# Patient Record
Sex: MALE
[De-identification: names, ages, dates, MRNs, and addresses within clinical notes are randomized per-mention and may not be internally consistent; named-entity substitution may affect disease eponyms.]

---

## 2019-05-10 ENCOUNTER — HOSPITAL ENCOUNTER (OUTPATIENT)
Dept: HOSPITAL 74 - JER | Age: 79
Setting detail: OBSERVATION
LOS: 2 days | Discharge: HOME | End: 2019-05-12
Attending: FAMILY MEDICINE | Admitting: FAMILY MEDICINE
Payer: COMMERCIAL

## 2019-05-10 VITALS — BODY MASS INDEX: 25.8 KG/M2

## 2019-05-10 DIAGNOSIS — Z79.82: ICD-10-CM

## 2019-05-10 DIAGNOSIS — E78.5: ICD-10-CM

## 2019-05-10 DIAGNOSIS — J32.9: ICD-10-CM

## 2019-05-10 DIAGNOSIS — R09.02: ICD-10-CM

## 2019-05-10 DIAGNOSIS — I10: ICD-10-CM

## 2019-05-10 DIAGNOSIS — I25.10: ICD-10-CM

## 2019-05-10 DIAGNOSIS — N17.9: Primary | ICD-10-CM

## 2019-05-10 DIAGNOSIS — Z95.5: ICD-10-CM

## 2019-05-10 DIAGNOSIS — J44.1: ICD-10-CM

## 2019-05-10 LAB
ALBUMIN SERPL-MCNC: 3.7 G/DL (ref 3.4–5)
ALP SERPL-CCNC: 83 U/L (ref 45–117)
ALT SERPL-CCNC: 26 U/L (ref 13–61)
ANION GAP SERPL CALC-SCNC: 4 MMOL/L (ref 8–16)
AST SERPL-CCNC: 22 U/L (ref 15–37)
BASOPHILS # BLD: 0.7 % (ref 0–2)
BILIRUB SERPL-MCNC: 0.8 MG/DL (ref 0.2–1)
BNP SERPL-MCNC: 146.9 PG/ML (ref 5–450)
BUN SERPL-MCNC: 25 MG/DL (ref 7–18)
CALCIUM SERPL-MCNC: 8.7 MG/DL (ref 8.5–10.1)
CHLORIDE SERPL-SCNC: 103 MMOL/L (ref 98–107)
CO2 SERPL-SCNC: 31 MMOL/L (ref 21–32)
CREAT SERPL-MCNC: 1.7 MG/DL (ref 0.55–1.3)
DEPRECATED RDW RBC AUTO: 14.2 % (ref 11.9–15.9)
EOSINOPHIL # BLD: 0.6 % (ref 0–4.5)
GLUCOSE SERPL-MCNC: 79 MG/DL (ref 74–106)
HCT VFR BLD CALC: 48.6 % (ref 35.4–49)
HGB BLD-MCNC: 15.8 GM/DL (ref 11.7–16.9)
LYMPHOCYTES # BLD: 17.5 % (ref 8–40)
MCH RBC QN AUTO: 28.8 PG (ref 25.7–33.7)
MCHC RBC AUTO-ENTMCNC: 32.5 G/DL (ref 32–35.9)
MCV RBC: 88.7 FL (ref 80–96)
MONOCYTES # BLD AUTO: 17.8 % (ref 3.8–10.2)
NEUTROPHILS # BLD: 63.4 % (ref 42.8–82.8)
PH BLDV: 7.36 [PH] (ref 7.31–7.41)
PLATELET # BLD AUTO: 140 K/MM3 (ref 134–434)
PMV BLD: 9.1 FL (ref 7.5–11.1)
POTASSIUM SERPLBLD-SCNC: 4.8 MMOL/L (ref 3.5–5.1)
PROT SERPL-MCNC: 6.6 G/DL (ref 6.4–8.2)
RBC # BLD AUTO: 5.48 M/MM3 (ref 4–5.6)
SODIUM SERPL-SCNC: 138 MMOL/L (ref 136–145)
VENOUS PC02: 54 MMHG (ref 41–51)
VENOUS PO2: 17.6 MMHG (ref 30–40)
WBC # BLD AUTO: 5 K/MM3 (ref 4–10)

## 2019-05-10 PROCEDURE — G0378 HOSPITAL OBSERVATION PER HR: HCPCS

## 2019-05-10 RX ADMIN — MONTELUKAST SODIUM SCH MG: 10 TABLET, COATED ORAL at 22:13

## 2019-05-10 RX ADMIN — IPRATROPIUM BROMIDE AND ALBUTEROL SULFATE SCH AMP: .5; 3 SOLUTION RESPIRATORY (INHALATION) at 20:51

## 2019-05-10 RX ADMIN — IPRATROPIUM BROMIDE AND ALBUTEROL SULFATE SCH AMP: .5; 3 SOLUTION RESPIRATORY (INHALATION) at 19:02

## 2019-05-10 RX ADMIN — IPRATROPIUM BROMIDE AND ALBUTEROL SULFATE SCH AMP: .5; 3 SOLUTION RESPIRATORY (INHALATION) at 19:40

## 2019-05-11 LAB
ALBUMIN SERPL-MCNC: 3.1 G/DL (ref 3.4–5)
ALP SERPL-CCNC: 68 U/L (ref 45–117)
ALT SERPL-CCNC: 19 U/L (ref 13–61)
ANION GAP SERPL CALC-SCNC: 8 MMOL/L (ref 8–16)
AST SERPL-CCNC: 15 U/L (ref 15–37)
BASOPHILS # BLD: 0.4 % (ref 0–2)
BILIRUB SERPL-MCNC: 1 MG/DL (ref 0.2–1)
BUN SERPL-MCNC: 20 MG/DL (ref 7–18)
CALCIUM SERPL-MCNC: 8 MG/DL (ref 8.5–10.1)
CHLORIDE SERPL-SCNC: 105 MMOL/L (ref 98–107)
CO2 SERPL-SCNC: 26 MMOL/L (ref 21–32)
CREAT SERPL-MCNC: 1 MG/DL (ref 0.55–1.3)
DEPRECATED RDW RBC AUTO: 14.1 % (ref 11.9–15.9)
EOSINOPHIL # BLD: 0.1 % (ref 0–4.5)
GLUCOSE SERPL-MCNC: 106 MG/DL (ref 74–106)
HCT VFR BLD CALC: 42.4 % (ref 35.4–49)
HGB BLD-MCNC: 14.1 GM/DL (ref 11.7–16.9)
LYMPHOCYTES # BLD: 4.9 % (ref 8–40)
MCH RBC QN AUTO: 29.4 PG (ref 25.7–33.7)
MCHC RBC AUTO-ENTMCNC: 33.2 G/DL (ref 32–35.9)
MCV RBC: 88.4 FL (ref 80–96)
MONOCYTES # BLD AUTO: 5.1 % (ref 3.8–10.2)
NEUTROPHILS # BLD: 89.5 % (ref 42.8–82.8)
PLATELET # BLD AUTO: 108 K/MM3 (ref 134–434)
PMV BLD: 9.3 FL (ref 7.5–11.1)
POTASSIUM SERPLBLD-SCNC: 4.1 MMOL/L (ref 3.5–5.1)
PROT SERPL-MCNC: 5.6 G/DL (ref 6.4–8.2)
RBC # BLD AUTO: 4.79 M/MM3 (ref 4–5.6)
SODIUM SERPL-SCNC: 139 MMOL/L (ref 136–145)
WBC # BLD AUTO: 7.3 K/MM3 (ref 4–10)

## 2019-05-11 PROCEDURE — 3E0F7GC INTRODUCTION OF OTHER THERAPEUTIC SUBSTANCE INTO RESPIRATORY TRACT, VIA NATURAL OR ARTIFICIAL OPENING: ICD-10-PCS | Performed by: FAMILY MEDICINE

## 2019-05-11 PROCEDURE — 3E0333Z INTRODUCTION OF ANTI-INFLAMMATORY INTO PERIPHERAL VEIN, PERCUTANEOUS APPROACH: ICD-10-PCS | Performed by: FAMILY MEDICINE

## 2019-05-11 PROCEDURE — 3E03329 INTRODUCTION OF OTHER ANTI-INFECTIVE INTO PERIPHERAL VEIN, PERCUTANEOUS APPROACH: ICD-10-PCS | Performed by: FAMILY MEDICINE

## 2019-05-11 PROCEDURE — 3E0337Z INTRODUCTION OF ELECTROLYTIC AND WATER BALANCE SUBSTANCE INTO PERIPHERAL VEIN, PERCUTANEOUS APPROACH: ICD-10-PCS | Performed by: FAMILY MEDICINE

## 2019-05-11 RX ADMIN — ASPIRIN 81 MG SCH MG: 81 TABLET ORAL at 09:24

## 2019-05-11 RX ADMIN — METHYLPREDNISOLONE SODIUM SUCCINATE SCH MG: 40 INJECTION, POWDER, FOR SOLUTION INTRAMUSCULAR; INTRAVENOUS at 15:23

## 2019-05-11 RX ADMIN — METHYLPREDNISOLONE SODIUM SUCCINATE SCH MG: 40 INJECTION, POWDER, FOR SOLUTION INTRAMUSCULAR; INTRAVENOUS at 09:24

## 2019-05-11 RX ADMIN — CLOPIDOGREL BISULFATE SCH MG: 75 TABLET, FILM COATED ORAL at 09:24

## 2019-05-11 RX ADMIN — METHYLPREDNISOLONE SODIUM SUCCINATE SCH MG: 40 INJECTION, POWDER, FOR SOLUTION INTRAMUSCULAR; INTRAVENOUS at 21:03

## 2019-05-11 RX ADMIN — IPRATROPIUM BROMIDE SCH: 0.5 SOLUTION RESPIRATORY (INHALATION) at 05:21

## 2019-05-11 RX ADMIN — IPRATROPIUM BROMIDE AND ALBUTEROL SULFATE SCH AMP: .5; 3 SOLUTION RESPIRATORY (INHALATION) at 20:09

## 2019-05-11 RX ADMIN — METHYLPREDNISOLONE SODIUM SUCCINATE SCH MG: 40 INJECTION, POWDER, FOR SOLUTION INTRAMUSCULAR; INTRAVENOUS at 02:27

## 2019-05-11 RX ADMIN — AZITHROMYCIN DIHYDRATE SCH MLS/HR: 500 INJECTION, POWDER, LYOPHILIZED, FOR SOLUTION INTRAVENOUS at 09:30

## 2019-05-11 RX ADMIN — IPRATROPIUM BROMIDE SCH AMP: 0.5 SOLUTION RESPIRATORY (INHALATION) at 08:13

## 2019-05-11 RX ADMIN — BUDESONIDE AND FORMOTEROL FUMARATE DIHYDRATE SCH PUFF: 80; 4.5 AEROSOL RESPIRATORY (INHALATION) at 21:04

## 2019-05-11 RX ADMIN — IPRATROPIUM BROMIDE SCH AMP: 0.5 SOLUTION RESPIRATORY (INHALATION) at 12:28

## 2019-05-11 RX ADMIN — IPRATROPIUM BROMIDE AND ALBUTEROL SULFATE SCH AMP: .5; 3 SOLUTION RESPIRATORY (INHALATION) at 16:13

## 2019-05-11 RX ADMIN — MONTELUKAST SODIUM SCH MG: 10 TABLET, COATED ORAL at 21:03

## 2019-05-11 RX ADMIN — BUDESONIDE AND FORMOTEROL FUMARATE DIHYDRATE SCH PUFF: 80; 4.5 AEROSOL RESPIRATORY (INHALATION) at 09:30

## 2019-05-11 RX ADMIN — VERAPAMIL HYDROCHLORIDE SCH MG: 120 TABLET, FILM COATED, EXTENDED RELEASE ORAL at 09:25

## 2019-05-11 RX ADMIN — LOSARTAN POTASSIUM SCH MG: 50 TABLET, FILM COATED ORAL at 09:24

## 2019-05-12 VITALS — HEART RATE: 78 BPM | DIASTOLIC BLOOD PRESSURE: 65 MMHG | SYSTOLIC BLOOD PRESSURE: 116 MMHG | TEMPERATURE: 98.1 F

## 2019-05-12 RX ADMIN — ASPIRIN 81 MG SCH MG: 81 TABLET ORAL at 09:51

## 2019-05-12 RX ADMIN — IPRATROPIUM BROMIDE AND ALBUTEROL SULFATE SCH AMP: .5; 3 SOLUTION RESPIRATORY (INHALATION) at 07:59

## 2019-05-12 RX ADMIN — METHYLPREDNISOLONE SODIUM SUCCINATE SCH MG: 40 INJECTION, POWDER, FOR SOLUTION INTRAMUSCULAR; INTRAVENOUS at 03:58

## 2019-05-12 RX ADMIN — VERAPAMIL HYDROCHLORIDE SCH MG: 120 TABLET, FILM COATED, EXTENDED RELEASE ORAL at 09:59

## 2019-05-12 RX ADMIN — LOSARTAN POTASSIUM SCH MG: 50 TABLET, FILM COATED ORAL at 09:51

## 2019-05-12 RX ADMIN — AZITHROMYCIN DIHYDRATE SCH MLS/HR: 500 INJECTION, POWDER, LYOPHILIZED, FOR SOLUTION INTRAVENOUS at 11:00

## 2019-05-12 RX ADMIN — IPRATROPIUM BROMIDE AND ALBUTEROL SULFATE SCH: .5; 3 SOLUTION RESPIRATORY (INHALATION) at 11:21

## 2019-05-12 RX ADMIN — METHYLPREDNISOLONE SODIUM SUCCINATE SCH MG: 40 INJECTION, POWDER, FOR SOLUTION INTRAMUSCULAR; INTRAVENOUS at 08:42

## 2019-05-12 RX ADMIN — BUDESONIDE AND FORMOTEROL FUMARATE DIHYDRATE SCH PUFF: 80; 4.5 AEROSOL RESPIRATORY (INHALATION) at 10:00

## 2019-05-12 RX ADMIN — CLOPIDOGREL BISULFATE SCH MG: 75 TABLET, FILM COATED ORAL at 09:51

## 2022-05-26 PROBLEM — Z00.00 ENCOUNTER FOR PREVENTIVE HEALTH EXAMINATION: Status: ACTIVE | Noted: 2022-05-26

## 2022-05-27 ENCOUNTER — APPOINTMENT (OUTPATIENT)
Dept: UROLOGY | Facility: CLINIC | Age: 82
End: 2022-05-27
Payer: MEDICARE

## 2022-05-27 ENCOUNTER — TRANSCRIPTION ENCOUNTER (OUTPATIENT)
Age: 82
End: 2022-05-27

## 2022-05-27 DIAGNOSIS — E78.5 HYPERLIPIDEMIA, UNSPECIFIED: ICD-10-CM

## 2022-05-27 DIAGNOSIS — Z86.79 PERSONAL HISTORY OF OTHER DISEASES OF THE CIRCULATORY SYSTEM: ICD-10-CM

## 2022-05-27 DIAGNOSIS — I10 ESSENTIAL (PRIMARY) HYPERTENSION: ICD-10-CM

## 2022-05-27 DIAGNOSIS — I25.10 ATHEROSCLEROTIC HEART DISEASE OF NATIVE CORONARY ARTERY W/OUT ANGINA PECTORIS: ICD-10-CM

## 2022-05-27 DIAGNOSIS — Z87.891 PERSONAL HISTORY OF NICOTINE DEPENDENCE: ICD-10-CM

## 2022-05-27 DIAGNOSIS — J43.9 EMPHYSEMA, UNSPECIFIED: ICD-10-CM

## 2022-05-27 PROCEDURE — 99204 OFFICE O/P NEW MOD 45 MIN: CPT | Mod: 95

## 2022-05-27 RX ORDER — MONTELUKAST 10 MG/1
10 TABLET, FILM COATED ORAL
Refills: 0 | Status: ACTIVE | COMMUNITY

## 2022-05-27 RX ORDER — BUDESONIDE AND FORMOTEROL FUMARATE DIHYDRATE 160; 4.5 UG/1; UG/1
AEROSOL RESPIRATORY (INHALATION)
Refills: 0 | Status: ACTIVE | COMMUNITY

## 2022-05-27 RX ORDER — CLOPIDOGREL 75 MG/1
TABLET, FILM COATED ORAL
Refills: 0 | Status: ACTIVE | COMMUNITY

## 2022-05-27 RX ORDER — ATORVASTATIN CALCIUM 80 MG/1
TABLET, FILM COATED ORAL
Refills: 0 | Status: ACTIVE | COMMUNITY

## 2022-05-27 RX ORDER — APIXABAN 5 MG/1
TABLET, FILM COATED ORAL
Refills: 0 | Status: ACTIVE | COMMUNITY

## 2022-05-27 RX ORDER — LOSARTAN POTASSIUM 100 MG/1
TABLET, FILM COATED ORAL
Refills: 0 | Status: ACTIVE | COMMUNITY

## 2022-05-27 NOTE — ASSESSMENT
[FreeTextEntry1] : 80 yo male with elevated PSA 5.5 ng/ml and elevated 4K 75%. MRI on 11/11/21 demonstrated a PIRADS 3 lesion left apex. PSAD normal 0.09 ng/ml/cc, no prior biopsy, negative family hx.\par \par 1. Repeat MRI at [facility]\par 2. Book for biopsy\par 3. LUTS- happy not on alpha blocker\par \par He understands that many men with prostate cancer will die with the disease rather than of it and we also discussed the results large multi-center [place] and  prostate cancer screening trials. He also understands that PSA in and of itself does not diagnose prostate cancer but only assesses risk to a certain degree. The patient understands that to definitively screen for prostate cancer, a biopsy is required and this procedure has risks, including bleeding, infection, ED and urinary retention. The patient opted to move forward with the biopsy.\par \par The patient is aware to expect hematuria x 2 weeks and upto 4 weeks of hematospermia.  There is a risk of infection albeit much lower than a transrectal approach. In some cases patients can experience erectile dysfunction but this is usually self limiting.  Any fever/chills after the biopsy the patient is to contact the office and go to the ER for an immediate evaluation. He has been given paper instructions outlining these items - which includes medications to avoid prior to surgery.\par \par 1. CBC, BMP, PSA, Covid Test, UA UCx. EKG CXR\par 2. Cardiac and Medical Clearance. Will need to stop plavix x7 full days and eliquis x 2 full days. OK to be on baby aspirin. He will clear with his cardiologist\par 3. TP biopsy at [facility]\par 4. follow up 2 weeks after biopsy with his primary urologist or ourselves.\par 5. we will call with the path results once they are resulted.\par \par Follow up after MRI/before biopsy for image review and physical exam\par \par Chloe Castro NP, was in the office during the entirety of this telemedicine visit.

## 2022-05-27 NOTE — HISTORY OF PRESENT ILLNESS
[Home] : at home, [unfilled] , at the time of the visit. [Medical Office: ([facility])___] : at the medical office located in  [Verbal consent obtained from patient] : the patient, [unfilled] [FreeTextEntry1] : Dear Dr. Donaldson (PCP)\par Dear Dr. Anthony Montes (Rad Onc)\par \par \par Thank you so much for the referral to help care for your patient.\par \par \par Chief Complaint: Elevated PSA\par Date of first visit: 5/27/2022\par \par \par SARAH WHIPPLE  is a 81 year old  man with PMHx CAD s/p PCI (2 stents), atrial fibrillation, HLD, HTN, emphysema who presents for elevated PSA. He has met with Dr Montes, a radiation oncologist, because he thought he was primarily a urologist. Dr Montes ordered the prostate MRI. He states he does not have a urologist but has met with a few to be booked for a standard biopsy. \par \par His PSA is 5.5 ng/ml and 4K score of 75%. MRI on 11/11/21 demonstrated a PIRADS 3 lesion left apex pz and tz. His PSA density is normal (0.09 ng/ml/cc). He has never had a prostate biopsy and denies family hx of  malignancies.\par \par Experiencing moderate LUTS. Denies dysuria and hematuria. Happy not on alpha blocker.\par \par PSA Hx\par 5.55 10/20/21. 4K 75%\par \par MRI Hx\par MRI at [facility] on 11/11/2021.  59cc prostate with PIRADS 3 lesion measuring 8mm x 7mm x 7mm, left apex pz and tz.   No LAD No EPE, No Bony Lesions.  The clinical implications discussed with the patient.\par \par Biopsy Hx\par None prior\par \par 05/27/2022\par IPSS 10 QOL 2\par \par  Prostate cancer screening: the patient and I spoke at length about prostate cancer screening, its risks and its benefits. The patient has 2 (age, PSA) risk factors for prostate cancer.  He understands that many men with prostate cancer will die with the disease rather than of it. He also understands that PSA in and of itself does not diagnose prostate cancer but only assesses risk to a certain degree. The patient understands that to definitively screen for prostate cancer, a biopsy is required and this procedure has risks, including bleeding, infection, ED and urinary retention. The patient opted to repeat MRI and fusion biopsy. We did discuss his age and how we typically stop screening for prostate cancer at his age. He is aware that most men die with prostate cancer rather than of it. He is concerned about the aggressiveness of the disease should it be present and would like to proceed with biopsy.\par \par The patient denies fevers, chills, nausea and or vomiting and no unexplained weight loss.\par \par All pertinent laboratory, films and physician notes were reviewed.  Questionnaire results were discussed with patient.

## 2022-05-31 ENCOUNTER — APPOINTMENT (OUTPATIENT)
Dept: MRI IMAGING | Facility: CLINIC | Age: 82
End: 2022-05-31
Payer: MEDICARE

## 2022-05-31 ENCOUNTER — RESULT REVIEW (OUTPATIENT)
Age: 82
End: 2022-05-31

## 2022-05-31 ENCOUNTER — OUTPATIENT (OUTPATIENT)
Dept: OUTPATIENT SERVICES | Facility: HOSPITAL | Age: 82
LOS: 1 days | End: 2022-05-31

## 2022-05-31 PROCEDURE — G1004: CPT

## 2022-05-31 PROCEDURE — 72197 MRI PELVIS W/O & W/DYE: CPT | Mod: 26,ME

## 2022-06-01 ENCOUNTER — NON-APPOINTMENT (OUTPATIENT)
Age: 82
End: 2022-06-01

## 2022-06-01 DIAGNOSIS — R89.8 OTHER ABNORMAL FINDINGS IN SPECIMENS FROM OTHER ORGANS, SYSTEMS AND TISSUES: ICD-10-CM

## 2022-06-03 ENCOUNTER — NON-APPOINTMENT (OUTPATIENT)
Age: 82
End: 2022-06-03

## 2022-06-06 ENCOUNTER — APPOINTMENT (OUTPATIENT)
Dept: NUCLEAR MEDICINE | Facility: HOSPITAL | Age: 82
End: 2022-06-06

## 2022-06-06 ENCOUNTER — OUTPATIENT (OUTPATIENT)
Dept: OUTPATIENT SERVICES | Facility: HOSPITAL | Age: 82
LOS: 1 days | End: 2022-06-06
Payer: MEDICARE

## 2022-06-06 PROCEDURE — G1004: CPT

## 2022-06-06 PROCEDURE — 78306 BONE IMAGING WHOLE BODY: CPT | Mod: MG

## 2022-06-06 PROCEDURE — A9503: CPT

## 2022-06-06 PROCEDURE — 78306 BONE IMAGING WHOLE BODY: CPT | Mod: 26,MG

## 2022-06-07 ENCOUNTER — NON-APPOINTMENT (OUTPATIENT)
Age: 82
End: 2022-06-07

## 2022-06-08 ENCOUNTER — APPOINTMENT (OUTPATIENT)
Dept: UROLOGY | Facility: CLINIC | Age: 82
End: 2022-06-08
Payer: MEDICARE

## 2022-06-08 VITALS
WEIGHT: 195 LBS | SYSTOLIC BLOOD PRESSURE: 152 MMHG | OXYGEN SATURATION: 96 % | DIASTOLIC BLOOD PRESSURE: 86 MMHG | BODY MASS INDEX: 23.75 KG/M2 | TEMPERATURE: 98.2 F | HEART RATE: 62 BPM | HEIGHT: 76 IN

## 2022-06-08 PROCEDURE — 99214 OFFICE O/P EST MOD 30 MIN: CPT

## 2022-06-09 NOTE — ADDENDUM
[FreeTextEntry1] : I, Dr. Chew, personally performed the evaluation and management (E/M) services for this new patient.  That E/M includes conducting the initial examination, assessing all conditions, and establishing the plan of care.  Today, my ACP, Varsha Deleon, was here to observe my evaluation and management services for this patient to be followed going forward.\par

## 2022-06-09 NOTE — PHYSICAL EXAM
[General Appearance - Well Developed] : well developed [General Appearance - Well Nourished] : well nourished [Normal Appearance] : normal appearance [Well Groomed] : well groomed [General Appearance - In No Acute Distress] : no acute distress [Edema] : no peripheral edema [Respiration, Rhythm And Depth] : normal respiratory rhythm and effort [Exaggerated Use Of Accessory Muscles For Inspiration] : no accessory muscle use [Abdomen Soft] : soft [Abdomen Tenderness] : non-tender [Costovertebral Angle Tenderness] : no ~M costovertebral angle tenderness [Normal Station and Gait] : the gait and station were normal for the patient's age [] : no rash [No Focal Deficits] : no focal deficits [Oriented To Time, Place, And Person] : oriented to person, place, and time [Affect] : the affect was normal [Mood] : the mood was normal [Not Anxious] : not anxious [FreeTextEntry1] : declined MAYA/ exam

## 2022-06-09 NOTE — HISTORY OF PRESENT ILLNESS
[Home] : at home, [unfilled] , at the time of the visit. [Medical Office: ([facility] [place]___] : at the medical office located in  [Verbal consent obtained from patient] : the patient, [unfilled] [FreeTextEntry1] : Dear Dr. Donaldson (PCP)\par Dear Dr. Anthony Montes (Rad Onc)\par \par \par Thank you so much for the referral to help care for your patient.\par \par Chief Complaint: Elevated PSA\par Date of first visit: 5/27/2022\par \par SARAH WHIPPLE  is a 81 year old  man with PMHx CAD s/p PCI (2 stents), atrial fibrillation, HLD, HTN, emphysema who presents for elevated PSA. He has met with Dr Montes, a radiation oncologist, because he thought he was primarily a urologist. Dr Montes ordered the prostate MRI. He states he does not have a urologist but has met with a few to be booked for a standard biopsy. \par \par His PSA is 5.5 ng/ml and 4K score of 75%. MRI on 5/31/22 demonstrated 11/11/21 demonstrated a PIRADS 3 lesion right mid pzpl and diffusely abnormal bone marrow. Follow up bone scan negative. Prior MRI demonstrated a PIRADS 3 lesion left apex pz and tz. His PSA density is normal (0.11 ng/ml/cc). He has never had a prostate biopsy and denies family hx of  malignancies.\par \par Experiencing moderate LUTS. Denies dysuria and hematuria. Happy not on alpha blocker.\par \par 6/6/22 bone scan- no evidence of mets. irregular activity in right lower rib cage likely post traumatic\par \par PSA Hx\par 5.55 10/20/21. 4K 75%\par \par MRI Hx:\par MRI at [facility] on 6/8/2022. 48 cc prostate with PIRADS 3 lesion measuring 5 mm, right mid pzpl (series 5 image 18). No LAD No EPE, No Bony Lesions.  The images have been reviewed and clinical implications discussed with the patient. On review, the previously called left sided lesion is not visualized.\par \par MRI at [facility] on 11/11/2021.  59cc prostate with PIRADS 3 lesion measuring 8mm x 7mm x 7mm, left apex pz and tz.   No LAD No EPE, No Bony Lesions.  The clinical implications discussed with the patient. On review, this looks like a transition zone BPH nodule.\par \par Biopsy Hx\par None prior\par \par 06/08/22\par IPSS 11   QOL 1\par SUSANNA 25\par \par 05/27/2022\par IPSS 10 QOL 2\par \par  Prostate cancer screening: the patient and I spoke at length about prostate cancer screening, its risks and its benefits. The patient has 2 (age, PSA) risk factors for prostate cancer.  He understands that many men with prostate cancer will die with the disease rather than of it. He also understands that PSA in and of itself does not diagnose prostate cancer but only assesses risk to a certain degree. The patient understands that to definitively screen for prostate cancer, a biopsy is required and this procedure has risks, including bleeding, infection, ED and urinary retention. The patient opted to forego biopsy given age, low risk MRI, and PSA <10. \par \par The patient denies fevers, chills, nausea and or vomiting and no unexplained weight loss.\par \par All pertinent laboratory, films and physician notes were reviewed.  Questionnaire results were discussed with patient.\par \par I spent 31 minutes of non-face to face time reviewing the patient's records, chart, uploading processing MR images, transferring MR images from PACS to an independent workstation, reviewing images on independent workstation, speaking with their physician and planning their prostate biopsy and preparation of an upcoming visit for 06/08/2022 .  The imaging and planning was highly complex and took this entire time. \par \par \par

## 2022-06-09 NOTE — ASSESSMENT
[FreeTextEntry1] : 82 yo male with elevated PSA 5.5 ng/ml and elevated 4K 75%. MRI on 6/8/22 demonstrated a PIRADS 3 lesion right mid pzpl which on review seems like a BPH nodule. The previously called lesion on the R MRI is not visualized on this scan. His PSA density is normal (0.11 ng/ml/cc), no prior biopsy, neg FH, declined MAYA.\par \par Given his cardiac hx and his MD not clearing him to stop plavix, his low risk MRI, PSA <10 and normal PSAD it was discussed foregoing the biopsy and trending his PSA. He is okay with this decision. \par \par 1. PSA in 6 months\par 2. Consider MRI in 1-2 years pending PSA trend\par \par Follow up 6 months\par .\par Thank you very much for allowing me to assist in the care of this patient. Should you have any additional questions or concerns please do not hesitate to contact me.\par \par \par Sincerely,\par \par \par Vic Chew D.O.\par  of Urology and Radiology\par  of Urology at [facility]\par System Director for Prostate Cancer\par [address] [facility], [address]\par Phone: [phone]\par

## 2022-06-13 ENCOUNTER — TRANSCRIPTION ENCOUNTER (OUTPATIENT)
Age: 82
End: 2022-06-13

## 2022-06-16 ENCOUNTER — APPOINTMENT (OUTPATIENT)
Dept: UROLOGY | Facility: AMBULATORY SURGERY CENTER | Age: 82
End: 2022-06-16

## 2022-12-21 ENCOUNTER — APPOINTMENT (OUTPATIENT)
Dept: UROLOGY | Facility: CLINIC | Age: 82
End: 2022-12-21

## 2022-12-21 VITALS
SYSTOLIC BLOOD PRESSURE: 146 MMHG | HEART RATE: 66 BPM | TEMPERATURE: 97.9 F | DIASTOLIC BLOOD PRESSURE: 80 MMHG | OXYGEN SATURATION: 97 %

## 2022-12-21 PROCEDURE — 99213 OFFICE O/P EST LOW 20 MIN: CPT

## 2022-12-21 NOTE — ASSESSMENT
[FreeTextEntry1] : 81 yo male with elevated PSA 5.5 ng/ml and elevated 4K 75%. MRI on 6/8/22 demonstrated a PIRADS 3 lesion right mid pzpl which on review seems like a BPH nodule. The previously called lesion on the R MRI is not visualized on this scan. His PSA density is normal (0.11 ng/ml/cc), no prior biopsy, neg FH\par \par Given his cardiac hx and his MD not clearing him to stop plavix, his low risk MRI, PSA <10 and normal PSAD it was discussed foregoing the biopsy and trending his PSA. He is okay with this decision. \par \par 1. PSA in 6 months\par 2. Consider MRI in 1-2 years pending PSA trend\par 3. BPH mild luts no medications.\par \par Follow up 6 months\par \par Thank you very much for allowing me to assist in the care of this patient. Should you have any additional questions or concerns please do not hesitate to contact me.\par \par \par Sincerely,\par \par \par Vic Chew D.O.\par  of Urology and Radiology\par  of Urology at [facility]\par System Director for Prostate Cancer\par [address], [address] [facility], [address]\par Phone: [phone]\par

## 2022-12-21 NOTE — HISTORY OF PRESENT ILLNESS
[FreeTextEntry1] : Dear Dr. Donaldson (PCP)\par \par Thank you so much for the referral to help care for your patient.\par \par Chief Complaint: Elevated PSA\par Date of first visit: 5/27/2022\par \par SARAH WHIPPLE  is a 82 year old  man with PMHx CAD s/p PCI (2 stents), atrial fibrillation, HLD, HTN, emphysema who presents for elevated PSA.  The patient was offered a biopsy but given the clinical factors we could hold off on a biopsy.  The patient understands risk of ageing and developing prostate cancer but not all prostate cancer is aggressive.\par \par His PSA is 5.5 ng/ml and 4K score of 75%. MRI on 5/31/22 demonstrated 11/11/21 demonstrated a PIRADS 3 lesion right mid pzpl and diffusely abnormal bone marrow. Follow up bone scan negative. Prior MRI demonstrated a PIRADS 3 lesion left apex pz and tz. His PSA density is normal (0.11 ng/ml/cc). He has never had a prostate biopsy and denies family hx of  malignancies.\par \par Experiencing moderate LUTS. Denies dysuria and hematuria. Happy not on alpha blocker.\par \par 6/6/22 bone scan- no evidence of mets. irregular activity in right lower rib cage likely post traumatic\par \par PSA Hx\par 5.55 10/20/21. 4K 75%\par \par MRI Hx:\par MRI at [facility] on 5/31/2022. 47 ml prostate with PIRADS 3 lesion measuring 5 mm, right mid pzpl (series 5 image 18). No LAD No EPE, No Bony Lesions.  The images have been reviewed and clinical implications discussed with the patient. On review, the previously called left sided lesion is not visualized.\par \par MRI at [facility] on 11/11/2021.  59cc prostate with PIRADS 3 lesion measuring 8mm x 7mm x 7mm, left apex pz and tz.   No LAD No EPE, No Bony Lesions.  The clinical implications discussed with the patient. On review, this looks like a transition zone BPH nodule.\par \par Biopsy Hx\par None prior\par \par 12/21/2022\par IPSS 11     QOL 2\par SUSANNA 25\par \par 06/08/22\par IPSS 11   QOL 1\par SUSANNA 25\par \par 05/27/2022\par IPSS 10 QOL 2\par \par  Prostate cancer screening: the patient and I spoke at length about prostate cancer screening, its risks and its benefits. The patient has 2 (age, PSA) risk factors for prostate cancer.  He understands that many men with prostate cancer will die with the disease rather than of it. He also understands that PSA in and of itself does not diagnose prostate cancer but only assesses risk to a certain degree. The patient understands that to definitively screen for prostate cancer, a biopsy is required and this procedure has risks, including bleeding, infection, ED and urinary retention. The patient opted to forego biopsy given age, low risk MRI, and PSA <10. \par \par The patient denies fevers, chills, nausea and or vomiting and no unexplained weight loss.\par \par All pertinent laboratory, films and physician notes were reviewed.  Questionnaire results were discussed with patient.

## 2022-12-21 NOTE — PHYSICAL EXAM
[General Appearance - Well Developed] : well developed [General Appearance - Well Nourished] : well nourished [Abdomen Soft] : soft [Urethral Meatus] : meatus normal [Penis Abnormality] : normal circumcised penis [No Prostate Nodules] : no prostate nodules [Edema] : no peripheral edema [] : no respiratory distress [Oriented To Time, Place, And Person] : oriented to person, place, and time [No Focal Deficits] : no focal deficits

## 2022-12-27 ENCOUNTER — TRANSCRIPTION ENCOUNTER (OUTPATIENT)
Age: 82
End: 2022-12-27

## 2022-12-27 ENCOUNTER — NON-APPOINTMENT (OUTPATIENT)
Age: 82
End: 2022-12-27

## 2022-12-27 LAB
PSA FREE FLD-MCNC: 13 %
PSA FREE SERPL-MCNC: 0.82 NG/ML
PSA SERPL-MCNC: 6.36 NG/ML

## 2023-06-07 ENCOUNTER — APPOINTMENT (OUTPATIENT)
Dept: UROLOGY | Facility: CLINIC | Age: 83
End: 2023-06-07
Payer: MEDICARE

## 2023-06-07 VITALS
DIASTOLIC BLOOD PRESSURE: 67 MMHG | TEMPERATURE: 97.3 F | SYSTOLIC BLOOD PRESSURE: 119 MMHG | OXYGEN SATURATION: 98 % | HEART RATE: 71 BPM

## 2023-06-07 DIAGNOSIS — R39.9 UNSPECIFIED SYMPTOMS AND SIGNS INVOLVING THE GENITOURINARY SYSTEM: ICD-10-CM

## 2023-06-07 PROCEDURE — 99213 OFFICE O/P EST LOW 20 MIN: CPT

## 2023-06-07 NOTE — PHYSICAL EXAM
[General Appearance - Well Developed] : well developed [General Appearance - Well Nourished] : well nourished [Normal Appearance] : normal appearance [Well Groomed] : well groomed [General Appearance - In No Acute Distress] : no acute distress [Abdomen Soft] : soft [Abdomen Tenderness] : non-tender [Costovertebral Angle Tenderness] : no ~M costovertebral angle tenderness [Edema] : no peripheral edema [] : no respiratory distress [Respiration, Rhythm And Depth] : normal respiratory rhythm and effort [Exaggerated Use Of Accessory Muscles For Inspiration] : no accessory muscle use [Oriented To Time, Place, And Person] : oriented to person, place, and time [Affect] : the affect was normal [Mood] : the mood was normal [Not Anxious] : not anxious [Normal Station and Gait] : the gait and station were normal for the patient's age [No Focal Deficits] : no focal deficits [FreeTextEntry1] : neg MAYA 12/21/22

## 2023-06-07 NOTE — ADDENDUM
[FreeTextEntry1] : I, Dr. Chew, personally performed the evaluation and management (E/M) services for this established patient who presents today with (a) new problem(s)/exacerbation of (an) existing condition(s).  That E/M includes conducting the examination, assessing all new/exacerbated conditions, and establishing a new plan of care.  Today, my ACP, Varsha Deleon, was here to observe my evaluation and management services for this new problem/exacerbated condition to be followed going forward.\par

## 2023-06-07 NOTE — ASSESSMENT
[FreeTextEntry1] : 83 yo male with elevated PSA 6.36 ng/ml and elevated 4K 75%. MRI on 6/8/22 demonstrated a PIRADS 3 lesion right mid pzpl which on review seems like a BPH nodule. The previously called lesion on the R MRI is not visualized on this scan. His PSA density is normal (0.13 ng/ml/cc), no prior biopsy, neg FH\par \par Given his cardiac hx and his MD not clearing him to stop plavix, his low risk MRI, PSA <10 and normal PSAD it was discussed foregoing the biopsy and trending his PSA. He is okay with this decision. \par \par Elevated PSA\par - Trend PSA q6 months. Check today 6/7/23\par - Consider MRI in 1-3 years pending PSA trend (due May 8870-3238)\par \par BPH w/ mild LUTS\par - Happy on no medications.\par \par Follow up 6 months\par \par \par Thank you very much for allowing me to assist in the care of this patient. Should you have any additional questions or concerns please do not hesitate to contact me.\par \par \par Sincerely,\par \par \par Vic Chew D.O.\par Professor of Urology and Radiology\par  of Urology at [facility]\par System Director for Prostate Cancer\par [address] [facility]\par Phone: [phone] \par

## 2023-06-08 ENCOUNTER — TRANSCRIPTION ENCOUNTER (OUTPATIENT)
Age: 83
End: 2023-06-08

## 2023-06-08 LAB
PSA FREE FLD-MCNC: 13 %
PSA FREE SERPL-MCNC: 0.73 NG/ML
PSA SERPL-MCNC: 5.67 NG/ML

## 2024-02-05 ENCOUNTER — APPOINTMENT (OUTPATIENT)
Dept: UROLOGY | Facility: CLINIC | Age: 84
End: 2024-02-05
Payer: MEDICARE

## 2024-02-05 VITALS
SYSTOLIC BLOOD PRESSURE: 110 MMHG | HEART RATE: 77 BPM | DIASTOLIC BLOOD PRESSURE: 66 MMHG | TEMPERATURE: 97.3 F | OXYGEN SATURATION: 98 %

## 2024-02-05 DIAGNOSIS — R97.20 ELEVATED PROSTATE, SPECIFIC ANTIGEN [PSA]: ICD-10-CM

## 2024-02-05 PROCEDURE — 99213 OFFICE O/P EST LOW 20 MIN: CPT

## 2024-02-05 NOTE — HISTORY OF PRESENT ILLNESS
[FreeTextEntry1] : Dear Dr. Donaldson (PCP)  Thank you so much for the referral to help care for your patient. Chief Complaint: Elevated PSA Date of first visit: 5/27/2022  SARAH WHIPPLE is a 83  year old  man with PMHx CAD s/p PCI (4x stents), atrial fibrillation, HLD, HTN, emphysema who presents for elevated PSA. The patient was offered a biopsy but given the clinical factors we could hold off on a biopsy. The patient understands risk of ageing and developing prostate cancer but not all prostate cancer is aggressive.  His PSA is 6.36 ng/ml and 4K score of 75%. MRI on 5/31/22 demonstrated 11/11/21 demonstrated a PIRADS 3 lesion right mid pzpl and diffusely abnormal bone marrow. Follow up bone scan negative. Prior MRI demonstrated a PIRADS 3 lesion left apex pz and tz. His PSA density is normal (0.13 ng/ml/cc). He has never had a prostate biopsy and denies family hx of  malignancies.  Denies dysuria and hematuria. Happy with current urinary symptoms, not on alpha blocker, PVR 67cc today (2/5/24).   The patient presents today for 6-8 month follow-up. He states that his PCP recently checked his PSA and was noted to be "12." He does not have the records with him. Of note the patient recently had chest pressure and subsequently had two cardiac stents placed (January 2024).   6/6/22 bone scan- no evidence of mets. irregular activity in right lower rib cage likely post traumatic  PSA Hx 5.67 06/07/2023 6.36 12/21/2022 5.55 10/20/21. 4K 75%  MRI Hx: MRI at [facility] on 5/31/2022. 47 ml prostate with PIRADS 3 lesion measuring 5 mm, right mid pzpl (series 5 image 18). No LAD No EPE, No Bony Lesions. The images have been reviewed and clinical implications discussed with the patient. On review, the previously called left sided lesion is not visualized.  MRI at [facility] on 11/11/2021. 59cc prostate with PIRADS 3 lesion measuring 8mm x 7mm x 7mm, left apex pz and tz. No LAD No EPE, No Bony Lesions. The clinical implications discussed with the patient. On review, this looks like a transition zone BPH nodule.   Biopsy Hx - None  02/05/2024 6/7/2023 IPSS 9 QOL 2 SUSANNA 10  12/21/2022 IPSS 11 QOL 2 SUSANNA 25  06/08/22 IPSS 11 QOL 1 SUSANNA 25   05/27/2022 IPSS 10 QOL 2  Prostate cancer screening: the patient and I spoke at length about prostate cancer screening, its risks and its benefits. The patient has 2 (age, PSA) risk factors for prostate cancer. He understands that many men with prostate cancer will die with the disease rather than of it. He also understands that PSA in and of itself does not diagnose prostate cancer but only assesses risk to a certain degree. The patient understands that to definitively screen for prostate cancer, a biopsy is required and this procedure has risks, including bleeding, infection, ED and urinary retention. The patient opted to forego biopsy given age, low risk MRI, and PSA <10. Continue trending q6mo and reimage at 1-3 years May [id number].  The patient denies fevers, chills, nausea and or vomiting and no unexplained weight loss.  All pertinent laboratory, films and physician notes were reviewed. Questionnaire results were discussed with patient.

## 2024-02-05 NOTE — ASSESSMENT
[FreeTextEntry1] : 84 yo male with elevated PSA 6.36 ng/ml and elevated 4K 75%. MRI on 6/8/22 demonstrated a PIRADS 3 lesion right mid pzpl which on review seems like a BPH nodule. The previously called lesion on the R MRI is not visualized on this scan. His PSA density is normal (0.13 ng/ml/cc), no prior biopsy, neg FH.    Elevated PSA - Patient reported his PCP checked his PSA and jumped up to 12 (last checked June 2023 was 5.7).  - Will repeat PSA today to ensure real value - If remains elevated, will repeat MRI (compare to 5/2022).  - Given recent hx of CAD w recent PCI (x2 - January 2024), and on DAPT, discussed r/b/a of prostate cancer screening and will have discussion whether biopsy is warranted.   BPH w/ mild LUTS - Happy on no medications.  Follow up 6 months  Thank you very much for allowing me to assist in the care of this patient. Please do not hesitate to contact me with any additional questions or concerns.      Sincerely,     Vic Chew D.O. Professor of Urology and Radiology  of Urology at [facility] Director for Prostate Cancer [address] [facility], [facility] Phone: [phone]

## 2024-02-08 ENCOUNTER — TRANSCRIPTION ENCOUNTER (OUTPATIENT)
Age: 84
End: 2024-02-08

## 2024-02-08 LAB — PSA SERPL-MCNC: 6.94 NG/ML

## 2024-08-01 NOTE — ASSESSMENT
[FreeTextEntry1] : 85 yo male with elevated PSA 6.36 ng/ml and elevated 4K 75%. MRI on 6/8/22 demonstrated a PIRADS 3 lesion right mid pzpl which on review seems like a BPH nodule. The previously called lesion on the [facility] MRI is not visualized on this scan. His PSA density is normal (0.13 ng/ml/cc), no prior biopsy, neg FH.  Elevated PSA - Patient reported his PCP checked his PSA and jumped up to 12 (last checked June 2023 was 5.7). - Will repeat PSA today to ensure real value - If remains elevated, will repeat MRI (compare to 5/2022). - Given recent hx of CAD w recent PCI (x2 - January 2024), and on DAPT, discussed r/b/a of prostate cancer screening and will have discussion whether biopsy is warranted.  BPH w/ mild LUTS - Happy on no medications.  Follow up 6 months  Thank you very much for allowing me to assist in the care of this patient. Please do not hesitate to contact me with any additional questions or concerns.

## 2024-08-01 NOTE — HISTORY OF PRESENT ILLNESS
[FreeTextEntry1] : Dear Dr. Donaldson (PCP)  Thank you so much for the referral to help care for your patient. Chief Complaint: Elevated PSA Date of first visit: 5/27/2022  SARAH WHIPPLE is a 84 year old  man with PMHx CAD s/p PCI (4x stents), atrial fibrillation, HLD, HTN, emphysema who presents for elevated PSA. The patient was offered a biopsy but given the clinical factors we could hold off on a biopsy. The patient understands risk of ageing and developing prostate cancer but not all prostate cancer is aggressive.  His PSA is 6.36 ng/ml and 4K score of 75%. MRI on 5/31/22 demonstrated 11/11/21 demonstrated a PIRADS 3 lesion right mid pzpl and diffusely abnormal bone marrow. Follow up bone scan negative. Prior MRI demonstrated a PIRADS 3 lesion left apex pz and tz. His PSA density is normal (0.13 ng/ml/cc). He has never had a prostate biopsy and denies family hx of  malignancies.  Denies dysuria and hematuria. Happy with current urinary symptoms, not on alpha blocker, PVR 67cc today (2/5/24).  The patient presents today for 6-8 month follow-up. He states that his PCP recently checked his PSA and was noted to be "12." He does not have the records with him. Of note the patient recently had chest pressure and subsequently had two cardiac stents placed (January 2024).  6/6/22 bone scan- no evidence of mets. irregular activity in right lower rib cage likely post traumatic  PSA Hx 6.94 02/05/2024 5.67 06/07/2023 6.36 12/21/2022 5.55 10/20/21. 4K 75%  MRI Hx: MRI at [facility] on 5/31/2022. 47 ml prostate with PIRADS 3 lesion measuring 5 mm, right mid pzpl (series 5 image 18). No LAD No EPE, No Bony Lesions. The images have been reviewed and clinical implications discussed with the patient. On review, the previously called left sided lesion is not visualized.  MRI at [facility] on 11/11/2021. 59cc prostate with PIRADS 3 lesion measuring 8mm x 7mm x 7mm, left apex pz and tz. No LAD No EPE, No Bony Lesions. The clinical implications discussed with the patient. On review, this looks like a transition zone BPH nodule.   Biopsy Hx - None  08/12/2024 IPSS      QOL SUSANNA  02/05/2024 6/7/2023 IPSS 9 QOL 2 SUSANNA 10  12/21/2022 IPSS 11 QOL 2 SUSANNA 25  06/08/22 IPSS 11 QOL 1 SUSANNA 25   05/27/2022 IPSS 10 QOL 2  Prostate cancer screening: the patient and I spoke at length about prostate cancer screening, its risks and its benefits. The patient has 2 (age, PSA) risk factors for prostate cancer. He understands that many men with prostate cancer will die with the disease rather than of it. He also understands that PSA in and of itself does not diagnose prostate cancer but only assesses risk to a certain degree. The patient understands that to definitively screen for prostate cancer, a biopsy is required and this procedure has risks, including bleeding, infection, ED and urinary retention. The patient opted to forego biopsy given age, low risk MRI, and PSA <10. Continue trending q6mo and reimage at 1-3 years May 7048-5057.  The patient denies fevers, chills, nausea and or vomiting and no unexplained weight loss.  All pertinent laboratory, films and physician notes were reviewed. Questionnaire results were discussed with patient.

## 2024-08-12 ENCOUNTER — NON-APPOINTMENT (OUTPATIENT)
Age: 84
End: 2024-08-12

## 2024-08-12 ENCOUNTER — APPOINTMENT (OUTPATIENT)
Dept: UROLOGY | Facility: CLINIC | Age: 84
End: 2024-08-12
Payer: MEDICARE

## 2024-08-12 VITALS
DIASTOLIC BLOOD PRESSURE: 71 MMHG | SYSTOLIC BLOOD PRESSURE: 106 MMHG | OXYGEN SATURATION: 99 % | HEART RATE: 80 BPM | TEMPERATURE: 98.3 F

## 2024-08-12 DIAGNOSIS — R97.20 ELEVATED PROSTATE, SPECIFIC ANTIGEN [PSA]: ICD-10-CM

## 2024-08-12 PROCEDURE — 99213 OFFICE O/P EST LOW 20 MIN: CPT

## 2024-08-12 NOTE — ADDENDUM
[FreeTextEntry1] :   I, Dr. Chew, personally performed the evaluation and management (E/M) services for this established patient who presents today with (a) new problem(s)/exacerbation of (an) existing condition(s).  That E/M includes conducting the examination, assessing all new/exacerbated conditions, and establishing a new plan of care.  Today, my ACP, Lisa Cannon, ANP-BC, was here to observe my evaluation and management services for this new problem/exacerbated condition to be followed going forward.

## 2024-08-12 NOTE — ASSESSMENT
[FreeTextEntry1] : 84-yo male with elevated PSA 6.36 ng/ml and elevated 4K 75%. MRI on 6/8/22 demonstrated a PIRADS 3 lesion right mid pzpl which on review seems like a BPH nodule. The previously called lesion on the R MRI is not visualized on this scan. His PSA density is normal (0.13 ng/ml/cc), no prior biopsy, neg FH.  Elevated PSA - Patient reported his PCP checked his PSA and jumped up to 12 (last checked June 2023 was 5.7). -  PSA 6.94 2/5/24, repeat PSA today 8/12/24 - Given recent hx of CAD w recent PCI (x2 - January 2024), and on DAPT, discussed r/b/a of prostate cancer screening and will have a discussion whether biopsy is warranted, we will continue to monitor PSA for now  BPH w/ mild LUTS - Happy on no medications.  Follow up 6 months  Thank you very much for allowing me to assist in the care of this patient. Please do not hesitate to contact me with any additional questions or concerns.      Sincerely,     Vic Chew D.O. Professor of Urology and Radiology  of Urology at [facility] Director for Prostate Cancer [address] [facility], [facility] Phone: [phone]

## 2024-08-12 NOTE — PHYSICAL EXAM
[General Appearance - In No Acute Distress] : no acute distress [] : no respiratory distress [Prostate Tenderness] : the prostate was not tender [No Prostate Nodules] : no prostate nodules [Normal Station and Gait] : the gait and station were normal for the patient's age [Oriented To Time, Place, And Person] : oriented to person, place, and time [de-identified] : hemorrhoids

## 2024-08-12 NOTE — ASSESSMENT
[FreeTextEntry1] : 84-yo male with elevated PSA 6.36 ng/ml and elevated 4K 75%. MRI on 6/8/22 demonstrated a PIRADS 3 lesion right mid pzpl which on review seems like a BPH nodule. The previously called lesion on the R MRI is not visualized on this scan. His PSA density is normal (0.13 ng/ml/cc), no prior biopsy, neg FH.  Elevated PSA - Patient reported his PCP checked his PSA and jumped up to 12 (last checked June 2023 was 5.7). -  PSA 6.94 2/5/24, repeat PSA today 8/12/24 - Given recent hx of CAD w recent PCI (x2 - January 2024), and on DAPT, discussed r/b/a of prostate cancer screening and will have a discussion whether biopsy is warranted, we will continue to monitor PSA for now  BPH w/ mild LUTS - Happy on no medications.  Follow up 6 months  Thank you very much for allowing me to assist in the care of this patient. Please do not hesitate to contact me with any additional questions or concerns.      Sincerely,     Vic Chew D.O. Professor of Urology and Radiology  of Urology at [facility] Director for Prostate Cancer [address] [facility], [facility] Phone: [phone]

## 2024-08-12 NOTE — HISTORY OF PRESENT ILLNESS
[FreeTextEntry1] : Dear Dr. Donaldson (PCP)  Thank you so much for the referral to help care for your patient. Chief Complaint: Elevated PSA Date of first visit: 5/27/2022  SARAH WHIPPLE is a 84 year old  man with PMHx CAD s/p PCI (4x stents), atrial fibrillation, HLD, HTN, emphysema who presents for elevated PSA. The patient was offered a biopsy but given the clinical factors we could hold off on a biopsy. The patient understands risk of ageing and developing prostate cancer but not all prostate cancer is aggressive.  His PSA is 6.36 ng/ml and 4K score of 75%. MRI on 5/31/22 demonstrated 11/11/21 demonstrated a PIRADS 3 lesion right mid pzpl and diffusely abnormal bone marrow. Follow up bone scan negative. Prior MRI demonstrated a PIRADS 3 lesion left apex pz and tz. His PSA density is normal (0.13 ng/ml/cc). He has never had a prostate biopsy and denies family hx of  malignancies.  Denies dysuria and hematuria. Happy with current urinary symptoms, not on alpha blocker, PVR 67cc today (2/5/24).  The patient presents today for 6-8 month follow-up. He states that his PCP recently checked his PSA and was noted to be "12." He does not have the records with him. Of note the patient recently had chest pressure and subsequently had two cardiac stents placed (January 2024).  6/6/22 bone scan- no evidence of mets. irregular activity in right lower rib cage likely post traumatic  PSA Hx 6.94 02/05/2024 PSAD 0.14 ng/ml/cc 5.67 06/07/2023 6.36 12/21/2022 5.55 10/20/21. 4K 75%  MRI Hx: MRI at [facility] on 5/31/2022. 47 ml prostate with PIRADS 3 lesion measuring 5 mm, right mid pzpl (series 5 image 18). No LAD No EPE, No Bony Lesions. The images have been reviewed and clinical implications discussed with the patient. On review, the previously called left sided lesion is not visualized.  MRI at [facility] on 11/11/2021. 59cc prostate with PIRADS 3 lesion measuring 8mm x 7mm x 7mm, left apex pz and tz. No LAD No EPE, No Bony Lesions. The clinical implications discussed with the patient. On review, this looks like a transition zone BPH nodule.   Biopsy Hx - None  08/12/2024 IPSS      QOL SUSANNA  02/05/2024 6/7/2023 IPSS 9 QOL 2 SUSANNA 10  12/21/2022 IPSS 11 QOL 2 SUSANNA 25  06/08/22 IPSS 11 QOL 1 SUSANNA 25   05/27/2022 IPSS 10 QOL 2  Prostate cancer screening: the patient and I spoke at length about prostate cancer screening, its risks and its benefits. The patient has 2 (age, PSA) risk factors for prostate cancer. He understands that many men with prostate cancer will die with the disease rather than of it. He also understands that PSA in and of itself does not diagnose prostate cancer but only assesses risk to a certain degree. The patient understands that to definitively screen for prostate cancer, a biopsy is required and this procedure has risks, including bleeding, infection, ED and urinary retention. The patient opted to forego biopsy given age, low risk MRI, and PSA <10. Continue trending q6mo and reimage at 1-3 years May 2687-3497.  The patient denies fevers, chills, nausea and or vomiting and no unexplained weight loss.  All pertinent laboratory, films and physician notes were reviewed. Questionnaire results were discussed with patient.

## 2024-08-12 NOTE — HISTORY OF PRESENT ILLNESS
[FreeTextEntry1] : Dear Dr. Donaldson (PCP)  Thank you so much for the referral to help care for your patient. Chief Complaint: Elevated PSA Date of first visit: 5/27/2022  SARAH WHIPPLE is a 84 year old  man with PMHx CAD s/p PCI (4x stents), atrial fibrillation, HLD, HTN, emphysema who presents for elevated PSA. The patient was offered a biopsy but given the clinical factors we could hold off on a biopsy. The patient understands risk of ageing and developing prostate cancer but not all prostate cancer is aggressive.  His PSA is 6.36 ng/ml and 4K score of 75%. MRI on 5/31/22 demonstrated 11/11/21 demonstrated a PIRADS 3 lesion right mid pzpl and diffusely abnormal bone marrow. Follow up bone scan negative. Prior MRI demonstrated a PIRADS 3 lesion left apex pz and tz. His PSA density is normal (0.13 ng/ml/cc). He has never had a prostate biopsy and denies family hx of  malignancies.  Denies dysuria and hematuria. Happy with current urinary symptoms, not on alpha blocker, PVR 67cc today (2/5/24).  The patient presents today for 6-8 month follow-up. He states that his PCP recently checked his PSA and was noted to be "12." He does not have the records with him. Of note the patient recently had chest pressure and subsequently had two cardiac stents placed (January 2024).  6/6/22 bone scan- no evidence of mets. irregular activity in right lower rib cage likely post traumatic  PSA Hx 6.94 02/05/2024 PSAD 0.14 ng/ml/cc 5.67 06/07/2023 6.36 12/21/2022 5.55 10/20/21. 4K 75%  MRI Hx: MRI at [facility] on 5/31/2022. 47 ml prostate with PIRADS 3 lesion measuring 5 mm, right mid pzpl (series 5 image 18). No LAD No EPE, No Bony Lesions. The images have been reviewed and clinical implications discussed with the patient. On review, the previously called left sided lesion is not visualized.  MRI at [facility] on 11/11/2021. 59cc prostate with PIRADS 3 lesion measuring 8mm x 7mm x 7mm, left apex pz and tz. No LAD No EPE, No Bony Lesions. The clinical implications discussed with the patient. On review, this looks like a transition zone BPH nodule.   Biopsy Hx - None  08/12/2024 IPSS      QOL SUSANNA  02/05/2024 6/7/2023 IPSS 9 QOL 2 SUSANNA 10  12/21/2022 IPSS 11 QOL 2 SUSANNA 25  06/08/22 IPSS 11 QOL 1 SUSANNA 25   05/27/2022 IPSS 10 QOL 2  Prostate cancer screening: the patient and I spoke at length about prostate cancer screening, its risks and its benefits. The patient has 2 (age, PSA) risk factors for prostate cancer. He understands that many men with prostate cancer will die with the disease rather than of it. He also understands that PSA in and of itself does not diagnose prostate cancer but only assesses risk to a certain degree. The patient understands that to definitively screen for prostate cancer, a biopsy is required and this procedure has risks, including bleeding, infection, ED and urinary retention. The patient opted to forego biopsy given age, low risk MRI, and PSA <10. Continue trending q6mo and reimage at 1-3 years May 9620-3181.  The patient denies fevers, chills, nausea and or vomiting and no unexplained weight loss.  All pertinent laboratory, films and physician notes were reviewed. Questionnaire results were discussed with patient.

## 2024-08-12 NOTE — PHYSICAL EXAM
[General Appearance - In No Acute Distress] : no acute distress [] : no respiratory distress [Prostate Tenderness] : the prostate was not tender [No Prostate Nodules] : no prostate nodules [Normal Station and Gait] : the gait and station were normal for the patient's age [Oriented To Time, Place, And Person] : oriented to person, place, and time [de-identified] : hemorrhoids

## 2024-08-14 ENCOUNTER — NON-APPOINTMENT (OUTPATIENT)
Age: 84
End: 2024-08-14

## 2025-02-10 NOTE — HISTORY OF PRESENT ILLNESS
[FreeTextEntry1] : Dear Dr. Ahmadi (PCP)  Thank you so much for the referral to help care for your patient. Chief Complaint: Elevated PSA Date of first visit: 5/27/2022  MICHAEL GAONA is a 84 year old  man with PMHx CAD s/p PCI (4x stents), atrial fibrillation, HLD, HTN, emphysema who presents for elevated PSA. The patient was offered a biopsy but given the clinical factors we could hold off on a biopsy. The patient understands risk of ageing and developing prostate cancer but not all prostate cancer is aggressive.  His PSA is 6.36 ng/ml and 4K score of 75%. MRI on 5/31/22 demonstrated 11/11/21 demonstrated a PIRADS 3 lesion right mid pzpl and diffusely abnormal bone marrow. Follow up bone scan negative. Prior MRI demonstrated a PIRADS 3 lesion left apex pz and tz. His PSA density is normal (0.13 ng/ml/cc). He has never had a prostate biopsy and denies family hx of  malignancies.  Denies dysuria and hematuria. Happy with current urinary symptoms, not on alpha blocker, PVR 67cc today (2/5/24).  The patient presents today for 6-8 month follow-up. He states that his PCP recently checked his PSA and was noted to be "12." He does not have the records with him. Of note the patient recently had chest pressure and subsequently had two cardiac stents placed (January 2024).  6/6/22 bone scan- no evidence of mets. irregular activity in right lower rib cage likely post traumatic  PSA Hx 7.33 08/12/2024 6.94 02/05/2024 PSAD 0.14 ng/ml/cc 5.67 06/07/2023 6.36 12/21/2022 5.55 10/20/21. 4K 75%  MRI Hx: MRI at [facility] on 5/31/2022. 47 ml prostate with PIRADS 3 lesion measuring 5 mm, right mid pzpl (series 5 image 18). No LAD No EPE, No Bony Lesions. The images have been reviewed and clinical implications discussed with the patient. On review, the previously called left sided lesion is not visualized.  MRI at [facility] on 11/11/2021. 59cc prostate with PIRADS 3 lesion measuring 8mm x 7mm x 7mm, left apex pz and tz. No LAD No EPE, No Bony Lesions. The clinical implications discussed with the patient. On review, this looks like a transition zone BPH nodule.  Biopsy Hx - None  02/19/2025 IPSS  QOL  BEST   08/12/2024 IPSS QOL BEST  02/05/2024 6/7/2023 IPSS 9 QOL 2 BEST 10  12/21/2022 IPSS 11 QOL 2 BEST 25  06/08/22 IPSS 11 QOL 1 BEST 25  05/27/2022 IPSS 10 QOL 2  Prostate cancer screening: the patient and I spoke at length about prostate cancer screening, its risks and its benefits. The patient has 2 (age, PSA) risk factors for prostate cancer. He understands that many men with prostate cancer will die with the disease rather than of it. He also understands that PSA in and of itself does not diagnose prostate cancer but only assesses risk to a certain degree. The patient understands that to definitively screen for prostate cancer, a biopsy is required and this procedure has risks, including bleeding, infection, ED and urinary retention. The patient opted to forego biopsy given age, low risk MRI, and PSA <10. Continue trending q6mo and reimage at 1-3 years May 8147-0539.  The patient denies fevers, chills, nausea and or vomiting and no unexplained weight loss.  All pertinent laboratory, films and physician notes were reviewed. Questionnaire results were discussed with patient.

## 2025-02-10 NOTE — ASSESSMENT
[FreeTextEntry1] : 84-yo male with elevated PSA 6.36 ng/ml and elevated 4K 75%. MRI on 6/8/22 demonstrated a PIRADS 3 lesion right mid pzpl which on review seems like a BPH nodule. The previously called lesion on the L[facility] MRI is not visualized on this scan. His PSA density is normal (0.13 ng/ml/cc), no prior biopsy, neg FH.  Elevated PSA - Patient reported his PCP checked his PSA and jumped up to 12 (last checked June 2023 was 5.7). - PSA 6.94 2/5/24, repeat PSA today 8/12/24 - Given recent hx of CAD w recent PCI (x2 - January 2024), and on DAPT, discussed r/b/a of prostate cancer screening and will have a discussion whether biopsy is warranted, we will continue to monitor PSA for now  BPH w/ mild LUTS - Happy on no medications.  Follow up 6 months  Thank you very much for allowing me to assist in the care of this patient. Please do not hesitate to contact me with any additional questions or concerns.   Sincerely,

## 2025-02-19 ENCOUNTER — NON-APPOINTMENT (OUTPATIENT)
Age: 85
End: 2025-02-19

## 2025-02-19 ENCOUNTER — APPOINTMENT (OUTPATIENT)
Dept: UROLOGY | Facility: CLINIC | Age: 85
End: 2025-02-19
Payer: MEDICARE

## 2025-02-19 VITALS
OXYGEN SATURATION: 96 % | BODY MASS INDEX: 21.68 KG/M2 | HEART RATE: 60 BPM | TEMPERATURE: 98.1 F | SYSTOLIC BLOOD PRESSURE: 143 MMHG | DIASTOLIC BLOOD PRESSURE: 74 MMHG | HEIGHT: 76 IN | WEIGHT: 178 LBS

## 2025-02-19 DIAGNOSIS — R39.9 UNSPECIFIED SYMPTOMS AND SIGNS INVOLVING THE GENITOURINARY SYSTEM: ICD-10-CM

## 2025-02-19 DIAGNOSIS — R97.20 ELEVATED PROSTATE, SPECIFIC ANTIGEN [PSA]: ICD-10-CM

## 2025-02-19 PROCEDURE — G2211 COMPLEX E/M VISIT ADD ON: CPT

## 2025-02-19 PROCEDURE — 99213 OFFICE O/P EST LOW 20 MIN: CPT

## 2025-02-19 NOTE — ASSESSMENT
[FreeTextEntry1] : 84-yo male with elevated PSA 6.36 ng/ml and elevated 4K 75%. MRI on 6/8/22 demonstrated a PIRADS 3 lesion right mid pzpl which on review seems like a BPH nodule. The previously called lesion on the R MRI is not visualized on this scan. His PSA density is normal (0.13 ng/ml/cc), no prior biopsy, neg FH.  Elevated PSA - Patient reported his PCP checked his PSA and jumped up to 12 (last checked June 2023 was 5.7). - PSA 6.94 2/5/24, 7.33 in 8/12/24, repeat PSA today (2/19/25) - Given recent hx of CAD w recent PCI (x2 - January 2024), and on DAPT, discussed r/b/a of prostate cancer screening and will have a discussion whether biopsy is warranted, we will continue to monitor PSA for now - consider repeating prostate MRI in 1223-3065 depending on PSA trend  BPH w/ mild LUTS - Happy on no medications.  Follow up 6 months  Thank you very much for allowing me to assist in the care of this patient. Please do not hesitate to contact me with any additional questions or concerns.     Sincerely,     Terence Kuhn D.O. Professor of Urology and Radiology  of Urology at [facility] Director for Prostate Cancer [address] [place] [address] Phone: [phone]

## 2025-02-19 NOTE — HISTORY OF PRESENT ILLNESS
[FreeTextEntry1] : Dear Dr. Ahmadi (PCP),  Thank you so much for the referral to help care for your patient. Chief Complaint: Elevated PSA Date of first visit: 5/27/2022  MICHAEL GAONA is a 84-year-old  man with PMHx CAD s/p PCI (4x stents), atrial fibrillation, HLD, HTN, emphysema who presents for elevated PSA. The patient was offered a biopsy but given the clinical factors we could hold off on a biopsy. The patient understands risk of ageing and developing prostate cancer but not all prostate cancer is aggressive.  His PSA is 6.36 ng/ml and 4K score of 75%. MRI on 5/31/22 demonstrated 11/11/21 demonstrated a PIRADS 3 lesion right mid pzpl and diffusely abnormal bone marrow. Follow up bone scan negative. Prior MRI demonstrated a PIRADS 3 lesion left apex pz and tz. His PSA density is normal (0.13 ng/ml/cc). He has never had a prostate biopsy and denies family hx of  malignancies.  Denies dysuria and hematuria. Happy with current urinary symptoms, not on alpha blocker, PVR 67cc (2/5/24).  The patient presents today for 6-8 month follow-up. He states that his PCP recently checked his PSA and was noted to be "12." He does not have the records with him. Of note the patient recently had chest pressure and subsequently had two cardiac stents placed (January 2024).  6/6/22 bone scan- no evidence of mets. irregular activity in right lower rib cage likely post traumatic  PSA Hx 7.33 08/12/2024 PSAD 0.15 ng/ml/cc  6.94 02/05/2024 PSAD 0.14 ng/ml/cc 5.67 06/07/2023 6.36 12/21/2022 5.55 10/20/21. 4K 75%  MRI Hx: MRI at [facility] on 5/31/2022. 47 ml prostate with PIRADS 3 lesion measuring 5 mm, right mid pzpl (series 5 image 18). No LAD No EPE, No Bony Lesions. The images have been reviewed and clinical implications discussed with the patient. On review, the previously called left sided lesion is not visualized.  MRI at [facility] on 11/11/2021. 59cc prostate with PIRADS 3 lesion measuring 8mm x 7mm x 7mm, left apex pz and tz. No LAD No EPE, No Bony Lesions. The clinical implications discussed with the patient. On review, this looks like a transition zone BPH nodule.  Biopsy Hx - None  02/19/2025 IPSS, QOL refused- states no new urinary symptoms, he is happy  6/7/2023 IPSS 9 QOL 2 BEST 10  12/21/2022 IPSS 11 QOL 2 BEST 25  06/08/22 IPSS 11 QOL 1 BEST 25  05/27/2022 IPSS 10 QOL 2  Prostate cancer screening: the patient and I spoke at length about prostate cancer screening, its risks and its benefits. The patient has 2 (age, PSA) risk factors for prostate cancer. He understands that many men with prostate cancer will die with the disease rather than of it. He also understands that PSA in and of itself does not diagnose prostate cancer but only assesses risk to a certain degree. The patient understands that to definitively screen for prostate cancer, a biopsy is required and this procedure has risks, including bleeding, infection, ED and urinary retention. The patient opted to forego biopsy given age, low risk MRI, and PSA <10. Continue trending q6mo and reimage at 1-3 years May 4823-7013.  The patient denies fevers, chills, nausea and or vomiting and no unexplained weight loss.  All pertinent laboratory, films and physician notes were reviewed. Questionnaire results were discussed with patient.

## 2025-02-19 NOTE — HISTORY OF PRESENT ILLNESS
[FreeTextEntry1] : Dear Dr. Ahmadi (PCP),  Thank you so much for the referral to help care for your patient. Chief Complaint: Elevated PSA Date of first visit: 5/27/2022  MICHAEL GAONA is a 84-year-old  man with PMHx CAD s/p PCI (4x stents), atrial fibrillation, HLD, HTN, emphysema who presents for elevated PSA. The patient was offered a biopsy but given the clinical factors we could hold off on a biopsy. The patient understands risk of ageing and developing prostate cancer but not all prostate cancer is aggressive.  His PSA is 6.36 ng/ml and 4K score of 75%. MRI on 5/31/22 demonstrated 11/11/21 demonstrated a PIRADS 3 lesion right mid pzpl and diffusely abnormal bone marrow. Follow up bone scan negative. Prior MRI demonstrated a PIRADS 3 lesion left apex pz and tz. His PSA density is normal (0.13 ng/ml/cc). He has never had a prostate biopsy and denies family hx of  malignancies.  Denies dysuria and hematuria. Happy with current urinary symptoms, not on alpha blocker, PVR 67cc (2/5/24).  The patient presents today for 6-8 month follow-up. He states that his PCP recently checked his PSA and was noted to be "12." He does not have the records with him. Of note the patient recently had chest pressure and subsequently had two cardiac stents placed (January 2024).  6/6/22 bone scan- no evidence of mets. irregular activity in right lower rib cage likely post traumatic  PSA Hx 7.33 08/12/2024 PSAD 0.15 ng/ml/cc  6.94 02/05/2024 PSAD 0.14 ng/ml/cc 5.67 06/07/2023 6.36 12/21/2022 5.55 10/20/21. 4K 75%  MRI Hx: MRI at [facility] on 5/31/2022. 47 ml prostate with PIRADS 3 lesion measuring 5 mm, right mid pzpl (series 5 image 18). No LAD No EPE, No Bony Lesions. The images have been reviewed and clinical implications discussed with the patient. On review, the previously called left sided lesion is not visualized.  MRI at [facility] on 11/11/2021. 59cc prostate with PIRADS 3 lesion measuring 8mm x 7mm x 7mm, left apex pz and tz. No LAD No EPE, No Bony Lesions. The clinical implications discussed with the patient. On review, this looks like a transition zone BPH nodule.  Biopsy Hx - None  02/19/2025 IPSS, QOL refused- states no new urinary symptoms, he is happy  6/7/2023 IPSS 9 QOL 2 BSET 10  12/21/2022 IPSS 11 QOL 2 BEST 25  06/08/22 IPSS 11 QOL 1 BEST 25  05/27/2022 IPSS 10 QOL 2  Prostate cancer screening: the patient and I spoke at length about prostate cancer screening, its risks and its benefits. The patient has 2 (age, PSA) risk factors for prostate cancer. He understands that many men with prostate cancer will die with the disease rather than of it. He also understands that PSA in and of itself does not diagnose prostate cancer but only assesses risk to a certain degree. The patient understands that to definitively screen for prostate cancer, a biopsy is required and this procedure has risks, including bleeding, infection, ED and urinary retention. The patient opted to forego biopsy given age, low risk MRI, and PSA <10. Continue trending q6mo and reimage at 1-3 years May 3702-5498.  The patient denies fevers, chills, nausea and or vomiting and no unexplained weight loss.  All pertinent laboratory, films and physician notes were reviewed. Questionnaire results were discussed with patient.

## 2025-02-19 NOTE — ASSESSMENT
[FreeTextEntry1] : 84-yo male with elevated PSA 6.36 ng/ml and elevated 4K 75%. MRI on 6/8/22 demonstrated a PIRADS 3 lesion right mid pzpl which on review seems like a BPH nodule. The previously called lesion on the R MRI is not visualized on this scan. His PSA density is normal (0.13 ng/ml/cc), no prior biopsy, neg FH.  Elevated PSA - Patient reported his PCP checked his PSA and jumped up to 12 (last checked June 2023 was 5.7). - PSA 6.94 2/5/24, 7.33 in 8/12/24, repeat PSA today (2/19/25) - Given recent hx of CAD w recent PCI (x2 - January 2024), and on DAPT, discussed r/b/a of prostate cancer screening and will have a discussion whether biopsy is warranted, we will continue to monitor PSA for now - consider repeating prostate MRI in 9612-2505 depending on PSA trend  BPH w/ mild LUTS - Happy on no medications.  Follow up 6 months  Thank you very much for allowing me to assist in the care of this patient. Please do not hesitate to contact me with any additional questions or concerns.     Sincerely,     Terence Kuhn D.O. Professor of Urology and Radiology  of Urology at [facility] Director for Prostate Cancer [address] [place] [address] Phone: [phone]

## 2025-02-19 NOTE — ADDENDUM
[FreeTextEntry1] :   I, Dr. Kuhn, personally performed the evaluation and management (E/M) services for this established patient who presents today with (a) new problem(s)/exacerbation of (an) existing condition(s).  That E/M includes conducting the examination, assessing all new/exacerbated conditions, and establishing a new plan of care.  Today, my ACP, Laly Mendoza, ANP-BC, was here to observe my evaluation and management services for this new problem/exacerbated condition to be followed going forward.

## 2025-02-20 ENCOUNTER — TRANSCRIPTION ENCOUNTER (OUTPATIENT)
Age: 85
End: 2025-02-20

## 2025-02-20 LAB
PSA FREE FLD-MCNC: 16 %
PSA FREE SERPL-MCNC: 0.75 NG/ML
PSA SERPL-MCNC: 4.76 NG/ML

## 2025-07-14 NOTE — HISTORY OF PRESENT ILLNESS
Patient provides verbal consent to leave a detailed message on his phone.    [FreeTextEntry1] : Dear Dr. Donaldson (PCP)\par \par Thank you so much for the referral to help care for your patient.\par \par Chief Complaint: Elevated PSA\par Date of first visit: 5/27/2022\par \par SARAH WHIPPLE  is a 82 year old  man with PMHx CAD s/p PCI (2 stents), atrial fibrillation, HLD, HTN, emphysema who presents for elevated PSA.  The patient was offered a biopsy but given the clinical factors we could hold off on a biopsy.  The patient understands risk of ageing and developing prostate cancer but not all prostate cancer is aggressive.\par \par His PSA is 6.36 ng/ml and 4K score of 75%. MRI on 5/31/22 demonstrated 11/11/21 demonstrated a PIRADS 3 lesion right mid pzpl and diffusely abnormal bone marrow. Follow up bone scan negative. Prior MRI demonstrated a PIRADS 3 lesion left apex pz and tz. His PSA density is normal (0.13 ng/ml/cc). He has never had a prostate biopsy and denies family hx of  malignancies.\par \par Experiencing moderate LUTS. Denies dysuria and hematuria. Happy not on alpha blocker.\par \par 6/6/22 bone scan- no evidence of mets. irregular activity in right lower rib cage likely post traumatic\par \par PSA Hx\par 6.36   12/21/2022\par 5.55 10/20/21. 4K 75%\par \par MRI Hx:\par MRI at [facility] on 5/31/2022. 47 ml prostate with PIRADS 3 lesion measuring 5 mm, right mid pzpl (series 5 image 18). No LAD No EPE, No Bony Lesions.  The images have been reviewed and clinical implications discussed with the patient. On review, the previously called left sided lesion is not visualized.\par \par MRI at [facility] on 11/11/2021.  59cc prostate with PIRADS 3 lesion measuring 8mm x 7mm x 7mm, left apex pz and tz.   No LAD No EPE, No Bony Lesions.  The clinical implications discussed with the patient. On review, this looks like a transition zone BPH nodule.\par \par Biopsy Hx\par None prior\par \par 6/7/2023\par IPSS 9  QOL 2\par SUSANNA 10\par \par 12/21/2022\par IPSS 11     QOL 2\par SUSANNA 25\par \par 06/08/22\par IPSS 11   QOL 1\par SUSANNA 25\par \par 05/27/2022\par IPSS 10 QOL 2\par \par Prostate cancer screening: the patient and I spoke at length about prostate cancer screening, its risks and its benefits. The patient has 2 (age, PSA) risk factors for prostate cancer.  He understands that many men with prostate cancer will die with the disease rather than of it. He also understands that PSA in and of itself does not diagnose prostate cancer but only assesses risk to a certain degree. The patient understands that to definitively screen for prostate cancer, a biopsy is required and this procedure has risks, including bleeding, infection, ED and urinary retention. The patient opted to forego biopsy given age, low risk MRI, and PSA <10. Continue trending q6mo and reimage at 1-3 years May [id number].\par \par The patient denies fevers, chills, nausea and or vomiting and no unexplained weight loss.\par \par All pertinent laboratory, films and physician notes were reviewed.  Questionnaire results were discussed with patient.

## 2025-08-06 ENCOUNTER — APPOINTMENT (OUTPATIENT)
Dept: UROLOGY | Facility: CLINIC | Age: 85
End: 2025-08-06
Payer: MEDICARE

## 2025-08-06 ENCOUNTER — NON-APPOINTMENT (OUTPATIENT)
Age: 85
End: 2025-08-06

## 2025-08-06 VITALS
HEART RATE: 61 BPM | OXYGEN SATURATION: 95 % | SYSTOLIC BLOOD PRESSURE: 114 MMHG | TEMPERATURE: 98 F | WEIGHT: 178 LBS | BODY MASS INDEX: 21.68 KG/M2 | HEIGHT: 76 IN | DIASTOLIC BLOOD PRESSURE: 72 MMHG

## 2025-08-06 DIAGNOSIS — N13.8 BENIGN PROSTATIC HYPERPLASIA WITH LOWER URINARY TRACT SYMPMS: ICD-10-CM

## 2025-08-06 DIAGNOSIS — N40.1 BENIGN PROSTATIC HYPERPLASIA WITH LOWER URINARY TRACT SYMPMS: ICD-10-CM

## 2025-08-06 DIAGNOSIS — R97.20 ELEVATED PROSTATE, SPECIFIC ANTIGEN [PSA]: ICD-10-CM

## 2025-08-06 DIAGNOSIS — R42 DIZZINESS AND GIDDINESS: ICD-10-CM

## 2025-08-06 PROCEDURE — G2211 COMPLEX E/M VISIT ADD ON: CPT

## 2025-08-06 PROCEDURE — 99214 OFFICE O/P EST MOD 30 MIN: CPT

## 2025-08-06 PROCEDURE — 51798 US URINE CAPACITY MEASURE: CPT

## 2025-08-06 RX ORDER — ALFUZOSIN HYDROCHLORIDE 10 MG/1
10 TABLET, EXTENDED RELEASE ORAL DAILY
Qty: 90 | Refills: 3 | Status: ACTIVE | COMMUNITY
Start: 2025-08-06 | End: 1900-01-01

## 2025-08-07 PROBLEM — N40.1 BPH WITH OBSTRUCTION/LOWER URINARY TRACT SYMPTOMS: Status: ACTIVE | Noted: 2025-08-07

## 2025-08-07 PROBLEM — R42 DIZZINESS ON STANDING: Status: ACTIVE | Noted: 2025-08-07

## 2025-08-07 LAB
PSA FREE FLD-MCNC: 13 %
PSA FREE SERPL-MCNC: 0.78 NG/ML
PSA SERPL-MCNC: 6.15 NG/ML